# Patient Record
Sex: MALE | URBAN - METROPOLITAN AREA
[De-identification: names, ages, dates, MRNs, and addresses within clinical notes are randomized per-mention and may not be internally consistent; named-entity substitution may affect disease eponyms.]

---

## 2017-01-30 ENCOUNTER — APPOINTMENT (RX ONLY)
Age: 52
Setting detail: DERMATOLOGY
End: 2017-01-30

## 2017-01-30 DIAGNOSIS — Z85.828 PERSONAL HISTORY OF OTHER MALIGNANT NEOPLASM OF SKIN: ICD-10-CM | Status: RESOLVED

## 2017-01-30 DIAGNOSIS — B07.8 OTHER VIRAL WARTS: ICD-10-CM | Status: INADEQUATELY CONTROLLED

## 2017-01-30 DIAGNOSIS — L57.0 ACTINIC KERATOSIS: ICD-10-CM | Status: INADEQUATELY CONTROLLED

## 2017-01-30 PROBLEM — D48.5 NEOPLASM OF UNCERTAIN BEHAVIOR OF SKIN: Status: ACTIVE | Noted: 2017-01-30

## 2017-01-30 PROCEDURE — 17110 DESTRUCTION B9 LES UP TO 14: CPT

## 2017-01-30 PROCEDURE — ? BIOPSY BY SHAVE METHOD

## 2017-01-30 PROCEDURE — ? ORDER FOR PHOTODYNAMIC THERAPY

## 2017-01-30 PROCEDURE — 11100: CPT | Mod: 59

## 2017-01-30 PROCEDURE — 99214 OFFICE O/P EST MOD 30 MIN: CPT | Mod: 25

## 2017-01-30 PROCEDURE — ? COUNSELING

## 2017-01-30 PROCEDURE — ? LIQUID NITROGEN

## 2017-01-30 ASSESSMENT — LOCATION ZONE DERM
LOCATION ZONE: NECK
LOCATION ZONE: ARM
LOCATION ZONE: FINGER

## 2017-01-30 ASSESSMENT — LOCATION DETAILED DESCRIPTION DERM
LOCATION DETAILED: MID POSTERIOR NECK
LOCATION DETAILED: RIGHT MID DORSAL SMALL FINGER
LOCATION DETAILED: LEFT PROXIMAL LATERAL POSTERIOR UPPER ARM

## 2017-01-30 ASSESSMENT — LOCATION SIMPLE DESCRIPTION DERM
LOCATION SIMPLE: POSTERIOR NECK
LOCATION SIMPLE: LEFT UPPER ARM
LOCATION SIMPLE: RIGHT SMALL FINGER

## 2017-01-30 NOTE — PROCEDURE: ORDER FOR PHOTODYNAMIC THERAPY
Face And Neck Incubation Time: 1 Hour
Pdt Type: GAYE-U
Hands Incubation Time: 2 Hours
Location: Face
Location Override: Face and ears
Face Incubation Time: 90 minutes
Frequency Of Pdt: Single Treatment
Detail Level: Zone
Consent: The procedure and risks were reviewed with the patient including but not limited to: burning, pigmentary changes, pain, blistering, scabbing, redness, and the possibility of needing numerous treatments. Strict photoprotection after the procedure.

## 2017-01-30 NOTE — PROCEDURE: BIOPSY BY SHAVE METHOD
X Size Of Lesion In Cm: 0
Wound Care: Aquaphor
Dressing: bandage
Detail Level: Detailed
Electrodesiccation Text: The wound bed was treated with electrodesiccation after the biopsy was performed.
Billing Type: Third-Party Bill
Bill 36215 For Specimen Handling/Conveyance To Laboratory?: no
Biopsy Method: Dermablade
Cryotherapy Text: The wound bed was treated with cryotherapy after the biopsy was performed.
Consent: Verbal consent was obtained and risks were reviewed including but not limited to scarring, infection, bleeding, scabbing, incomplete removal, nerve damage and allergy to anesthesia.
Post-Care Instructions: I reviewed with the patient in detail post-care instructions. Patient is to keep the biopsy site dry overnight, and then apply bacitracin twice daily until healed. Patient may apply hydrogen peroxide soaks to remove any crusting.
Anesthesia Volume In Cc: 0.5
Anesthesia Type: 1% lidocaine without epinephrine
Silver Nitrate Text: The wound bed was treated with silver nitrate after the biopsy was performed.
Biopsy Type: H and E
Notification Instructions: Patient will be notified of biopsy results. However, patient instructed to call the office if not contacted within 2 weeks.
Electrodesiccation And Curettage Text: The wound bed was treated with electrodesiccation and curettage after the biopsy was performed.
Size Of Lesion In Cm: 1.2
Hemostasis: Electrocautery
Type Of Destruction Used: Electrodesiccation and Curettage
Curettage Text: The wound bed was treated with curettage after the biopsy was performed.

## 2017-01-30 NOTE — PROCEDURE: LIQUID NITROGEN
Post-Care Instructions: I reviewed with the patient in detail post-care instructions. Patient is to wear sunprotection, and avoid picking at any of the treated lesions. Pt may apply Vaseline to crusted or scabbing areas.
Detail Level: Detailed
Medical Necessity Information: It is in your best interest to select a reason for this procedure from the list below. All of these items fulfill various CMS LCD requirements except the new and changing color options.
Medical Necessity Clause: This procedure was medically necessary because the lesions that were treated were:
Render Post-Care Instructions In Note?: no
Duration Of Freeze Thaw-Cycle (Seconds): 0
Consent: The patient's consent was obtained including but not limited to risks of crusting, scabbing, blistering, scarring, darker or lighter pigmentary change, recurrence, incomplete removal and infection.

## 2017-02-06 ENCOUNTER — APPOINTMENT (RX ONLY)
Age: 52
Setting detail: DERMATOLOGY
End: 2017-02-06

## 2017-02-06 PROBLEM — C44.619 BASAL CELL CARCINOMA OF SKIN OF LEFT UPPER LIMB, INCLUDING SHOULDER: Status: ACTIVE | Noted: 2017-02-06

## 2017-02-06 PROCEDURE — ? CURETTAGE AND DESTRUCTION

## 2017-02-06 PROCEDURE — 17263 DSTRJ MAL LES T/A/L 2.1-3.0: CPT | Mod: 79

## 2017-02-06 PROCEDURE — ? COUNSELING

## 2017-02-06 NOTE — PROCEDURE: CURETTAGE AND DESTRUCTION
Bill For Surgical Tray: no
Post-Care Instructions: I reviewed with the patient in detail post-care instructions. Patient is to keep the area dry for 48 hours, and not to engage in any swimming until the area is healed. Should the patient develop any fevers, chills, bleeding, severe pain patient will contact the office immediately.
What Was Performed First?: Destruction
Cautery Type: electrodesiccation
Detail Level: Detailed
Number Of Curettages: 3
Anesthesia Type: 1% lidocaine without epinephrine
Size Of Lesion In Cm: 1.2
Additional Information: (Optional): The wound was cleaned, and a pressure dressing was applied.  The patient received detailed post-op instructions.
Anesthesia Volume In Cc: 0.5
Size Of Lesion After Curettage: 2.1
Consent was obtained from the patient. The risks, benefits and alternatives to therapy were discussed in detail. Specifically, the risks of infection, scarring, bleeding, prolonged wound healing, nerve injury, incomplete removal, allergy to anesthesia and recurrence were addressed. Alternatives to ED&C, such as: surgical removal and XRT were also discussed.  Prior to the procedure, the treatment site was clearly identified and confirmed by the patient. All components of Universal Protocol/PAUSE Rule completed.
Bill As A Line Item Or As Units: Line Item

## 2017-03-07 ENCOUNTER — APPOINTMENT (RX ONLY)
Age: 52
Setting detail: DERMATOLOGY
End: 2017-03-07

## 2017-03-07 DIAGNOSIS — L57.0 ACTINIC KERATOSIS: ICD-10-CM

## 2017-03-07 PROCEDURE — ? PDT: BLUE

## 2017-03-07 PROCEDURE — 96567 PDT DSTR PRMLG LES SKN: CPT

## 2017-03-07 ASSESSMENT — LOCATION DETAILED DESCRIPTION DERM
LOCATION DETAILED: SUPERIOR MID FOREHEAD
LOCATION DETAILED: LEFT CYMBA CONCHA
LOCATION DETAILED: RIGHT INFERIOR CRUS OF ANTIHELIX

## 2017-03-07 ASSESSMENT — LOCATION SIMPLE DESCRIPTION DERM
LOCATION SIMPLE: LEFT EAR
LOCATION SIMPLE: RIGHT EAR
LOCATION SIMPLE: SUPERIOR FOREHEAD

## 2017-03-07 ASSESSMENT — LOCATION ZONE DERM
LOCATION ZONE: EAR
LOCATION ZONE: FACE

## 2017-03-07 NOTE — PROCEDURE: PDT: BLUE
Pre-Procedure Text: The treatment areas were cleaned and prepped in the usual fashion.
Illumination Time: 00:16:40
Consent: Verbal consent obtained.  The risks were reviewed with the patient including but not limited to: pigmentary changes, pain, blistering, scabbing, redness, and the remote possibility of scarring.
Occlusion: No
Which Photosensitizer Was Used: Levulan
Incubation Time: 90 minutes
Comments: Patient's wife will be driving patient home with a towel over his face and ears. Samples provided.
Anesthesia Volume In Cc: 0
Detail Level: Zone
Post-Care Instructions: I reviewed with the patient in detail post-care instructions. Patient is to avoid sunlight for the next 2 days, and wear sun protection. Patients may expect sunburn like redness, discomfort and scabbing.
Expiration Date (Optional): 08/19
Light Source: Lucian-U
Treatment Number: 1
Ndc# (Optional): 83672-857-63
Anesthesia Type: 1% lidocaine with epinephrine
Lot # (Optional): [id number]

## 2017-04-03 ENCOUNTER — APPOINTMENT (RX ONLY)
Age: 52
Setting detail: DERMATOLOGY
End: 2017-04-03

## 2017-04-03 DIAGNOSIS — Z87.2 PERSONAL HISTORY OF DISEASES OF THE SKIN AND SUBCUTANEOUS TISSUE: ICD-10-CM | Status: WELL CONTROLLED

## 2017-04-03 DIAGNOSIS — Z85.828 PERSONAL HISTORY OF OTHER MALIGNANT NEOPLASM OF SKIN: ICD-10-CM | Status: RESOLVED

## 2017-04-03 DIAGNOSIS — L82.1 OTHER SEBORRHEIC KERATOSIS: ICD-10-CM | Status: STABLE

## 2017-04-03 DIAGNOSIS — D22 MELANOCYTIC NEVI: ICD-10-CM | Status: STABLE

## 2017-04-03 PROBLEM — D22.61 MELANOCYTIC NEVI OF RIGHT UPPER LIMB, INCLUDING SHOULDER: Status: ACTIVE | Noted: 2017-04-03

## 2017-04-03 PROBLEM — D22.62 MELANOCYTIC NEVI OF LEFT UPPER LIMB, INCLUDING SHOULDER: Status: ACTIVE | Noted: 2017-04-03

## 2017-04-03 PROBLEM — D22.39 MELANOCYTIC NEVI OF OTHER PARTS OF FACE: Status: ACTIVE | Noted: 2017-04-03

## 2017-04-03 PROCEDURE — ? COUNSELING

## 2017-04-03 PROCEDURE — 99213 OFFICE O/P EST LOW 20 MIN: CPT

## 2017-04-03 ASSESSMENT — LOCATION DETAILED DESCRIPTION DERM
LOCATION DETAILED: LEFT PROXIMAL LATERAL POSTERIOR UPPER ARM
LOCATION DETAILED: RIGHT PROXIMAL DORSAL FOREARM
LOCATION DETAILED: LEFT PROXIMAL LATERAL POSTERIOR UPPER ARM
LOCATION DETAILED: RIGHT INFERIOR CENTRAL MALAR CHEEK
LOCATION DETAILED: LEFT CYMBA CONCHA
LOCATION DETAILED: LEFT INFERIOR CENTRAL MALAR CHEEK
LOCATION DETAILED: RIGHT DISTAL DORSAL FOREARM
LOCATION DETAILED: LEFT PROXIMAL DORSAL FOREARM
LOCATION DETAILED: RIGHT ANTIHELIX
LOCATION DETAILED: LEFT DISTAL DORSAL FOREARM

## 2017-04-03 ASSESSMENT — LOCATION SIMPLE DESCRIPTION DERM
LOCATION SIMPLE: LEFT FOREARM
LOCATION SIMPLE: RIGHT CHEEK
LOCATION SIMPLE: RIGHT EAR
LOCATION SIMPLE: LEFT UPPER ARM
LOCATION SIMPLE: LEFT CHEEK
LOCATION SIMPLE: LEFT UPPER ARM
LOCATION SIMPLE: LEFT EAR
LOCATION SIMPLE: RIGHT FOREARM

## 2017-04-03 ASSESSMENT — LOCATION ZONE DERM
LOCATION ZONE: EAR
LOCATION ZONE: FACE
LOCATION ZONE: ARM
LOCATION ZONE: ARM

## 2017-10-09 ENCOUNTER — APPOINTMENT (RX ONLY)
Age: 52
Setting detail: DERMATOLOGY
End: 2017-10-09

## 2017-10-09 DIAGNOSIS — L82.1 OTHER SEBORRHEIC KERATOSIS: ICD-10-CM | Status: STABLE

## 2017-10-09 DIAGNOSIS — Z85.828 PERSONAL HISTORY OF OTHER MALIGNANT NEOPLASM OF SKIN: ICD-10-CM | Status: RESOLVED

## 2017-10-09 DIAGNOSIS — D22 MELANOCYTIC NEVI: ICD-10-CM | Status: STABLE

## 2017-10-09 DIAGNOSIS — Z87.2 PERSONAL HISTORY OF DISEASES OF THE SKIN AND SUBCUTANEOUS TISSUE: ICD-10-CM | Status: STABLE

## 2017-10-09 PROBLEM — D22.5 MELANOCYTIC NEVI OF TRUNK: Status: ACTIVE | Noted: 2017-10-09

## 2017-10-09 PROCEDURE — 99214 OFFICE O/P EST MOD 30 MIN: CPT

## 2017-10-09 PROCEDURE — ? COUNSELING

## 2017-10-09 ASSESSMENT — LOCATION SIMPLE DESCRIPTION DERM
LOCATION SIMPLE: RIGHT UPPER BACK
LOCATION SIMPLE: LEFT FOREHEAD
LOCATION SIMPLE: LEFT ANTERIOR NECK
LOCATION SIMPLE: LEFT UPPER ARM

## 2017-10-09 ASSESSMENT — LOCATION ZONE DERM
LOCATION ZONE: TRUNK
LOCATION ZONE: NECK
LOCATION ZONE: ARM
LOCATION ZONE: FACE

## 2017-10-09 ASSESSMENT — LOCATION DETAILED DESCRIPTION DERM
LOCATION DETAILED: RIGHT MEDIAL UPPER BACK
LOCATION DETAILED: LEFT SUPERIOR ANTERIOR NECK
LOCATION DETAILED: RIGHT SUPERIOR UPPER BACK
LOCATION DETAILED: LEFT INFERIOR MEDIAL FOREHEAD
LOCATION DETAILED: LEFT ANTERIOR PROXIMAL UPPER ARM

## 2018-04-09 ENCOUNTER — APPOINTMENT (RX ONLY)
Age: 53
Setting detail: DERMATOLOGY
End: 2018-04-09

## 2018-04-09 DIAGNOSIS — B07.8 OTHER VIRAL WARTS: ICD-10-CM | Status: INADEQUATELY CONTROLLED

## 2018-04-09 DIAGNOSIS — B36.0 PITYRIASIS VERSICOLOR: ICD-10-CM | Status: INADEQUATELY CONTROLLED

## 2018-04-09 DIAGNOSIS — Z87.2 PERSONAL HISTORY OF DISEASES OF THE SKIN AND SUBCUTANEOUS TISSUE: ICD-10-CM

## 2018-04-09 DIAGNOSIS — Z85.828 PERSONAL HISTORY OF OTHER MALIGNANT NEOPLASM OF SKIN: ICD-10-CM

## 2018-04-09 PROCEDURE — ? LIQUID NITROGEN

## 2018-04-09 PROCEDURE — ? COUNSELING

## 2018-04-09 PROCEDURE — ? EDUCATIONAL RESOURCES PROVIDED

## 2018-04-09 PROCEDURE — ? PRESCRIPTION

## 2018-04-09 PROCEDURE — 17110 DESTRUCTION B9 LES UP TO 14: CPT

## 2018-04-09 PROCEDURE — 99214 OFFICE O/P EST MOD 30 MIN: CPT | Mod: 25

## 2018-04-09 RX ORDER — FLUCONAZOLE 150 MG/1
TABLET ORAL
Qty: 4 | Refills: 0 | Status: ERX

## 2018-04-09 ASSESSMENT — LOCATION ZONE DERM
LOCATION ZONE: NECK
LOCATION ZONE: FACE
LOCATION ZONE: ARM
LOCATION ZONE: TRUNK
LOCATION ZONE: FINGER

## 2018-04-09 ASSESSMENT — LOCATION SIMPLE DESCRIPTION DERM
LOCATION SIMPLE: LEFT UPPER ARM
LOCATION SIMPLE: LEFT ANTERIOR NECK
LOCATION SIMPLE: LEFT FOREHEAD
LOCATION SIMPLE: RIGHT THUMB
LOCATION SIMPLE: UPPER BACK

## 2018-04-09 ASSESSMENT — LOCATION DETAILED DESCRIPTION DERM
LOCATION DETAILED: LEFT INFERIOR MEDIAL FOREHEAD
LOCATION DETAILED: LEFT SUPERIOR ANTERIOR NECK
LOCATION DETAILED: RIGHT DISTAL RADIAL THUMB
LOCATION DETAILED: SUPERIOR THORACIC SPINE
LOCATION DETAILED: LEFT ANTERIOR PROXIMAL UPPER ARM

## 2018-04-09 ASSESSMENT — SEVERITY ASSESSMENT: SEVERITY: MILD

## 2018-04-09 NOTE — HPI: SKIN LESION
How Severe Is Your Skin Lesion?: mild
Has Your Skin Lesion Been Treated?: not been treated
Is This A New Presentation, Or A Follow-Up?: Skin Lesions
Additional History: Patient declines full skin exam and waist up exam.

## 2018-05-21 ENCOUNTER — APPOINTMENT (RX ONLY)
Age: 53
Setting detail: DERMATOLOGY
End: 2018-05-21

## 2018-05-21 DIAGNOSIS — B07.8 OTHER VIRAL WARTS: ICD-10-CM | Status: IMPROVED

## 2018-05-21 DIAGNOSIS — B07.0 PLANTAR WART: ICD-10-CM | Status: INADEQUATELY CONTROLLED

## 2018-05-21 PROCEDURE — ? COUNSELING

## 2018-05-21 PROCEDURE — ? LIQUID NITROGEN

## 2018-05-21 PROCEDURE — 17110 DESTRUCTION B9 LES UP TO 14: CPT

## 2018-05-21 ASSESSMENT — LOCATION DETAILED DESCRIPTION DERM
LOCATION DETAILED: RIGHT LATERAL PLANTAR MIDFOOT
LOCATION DETAILED: RIGHT DISTAL RADIAL THUMB

## 2018-05-21 ASSESSMENT — LOCATION SIMPLE DESCRIPTION DERM
LOCATION SIMPLE: RIGHT PLANTAR SURFACE
LOCATION SIMPLE: RIGHT THUMB

## 2018-05-21 ASSESSMENT — LOCATION ZONE DERM
LOCATION ZONE: FEET
LOCATION ZONE: FINGER

## 2018-06-25 ENCOUNTER — APPOINTMENT (RX ONLY)
Age: 53
Setting detail: DERMATOLOGY
End: 2018-06-25

## 2018-06-25 DIAGNOSIS — B07.0 PLANTAR WART: ICD-10-CM | Status: IMPROVED

## 2018-06-25 DIAGNOSIS — B07.8 OTHER VIRAL WARTS: ICD-10-CM | Status: IMPROVED

## 2018-06-25 PROBLEM — D48.5 NEOPLASM OF UNCERTAIN BEHAVIOR OF SKIN: Status: ACTIVE | Noted: 2018-06-25

## 2018-06-25 PROCEDURE — ? LIQUID NITROGEN

## 2018-06-25 PROCEDURE — 11100: CPT | Mod: 59

## 2018-06-25 PROCEDURE — 17110 DESTRUCTION B9 LES UP TO 14: CPT

## 2018-06-25 PROCEDURE — ? BIOPSY BY SHAVE METHOD

## 2018-06-25 ASSESSMENT — LOCATION SIMPLE DESCRIPTION DERM
LOCATION SIMPLE: RIGHT THUMB
LOCATION SIMPLE: RIGHT PLANTAR SURFACE

## 2018-06-25 ASSESSMENT — LOCATION DETAILED DESCRIPTION DERM
LOCATION DETAILED: RIGHT LATERAL PLANTAR MIDFOOT
LOCATION DETAILED: RIGHT DISTAL VENTRAL THUMB

## 2018-06-25 ASSESSMENT — LOCATION ZONE DERM
LOCATION ZONE: FEET
LOCATION ZONE: FINGER

## 2018-06-25 NOTE — HPI: SKIN LESION
How Severe Is Your Skin Lesion?: mild
Has Your Skin Lesion Been Treated?: not been treated
Is This A New Presentation, Or A Follow-Up?: Skin Lesion
Additional History: Patient states it usually bleeds if rubbed.

## 2018-06-25 NOTE — PROCEDURE: BIOPSY BY SHAVE METHOD
Destruction After The Procedure: No
Depth Of Biopsy: dermis
X Size Of Lesion In Cm: 0
Billing Type: Third-Party Bill
Additional Anticipated Plans: If malignant consider Mohs surgery
Type Of Destruction Used: Electrodesiccation and Curettage
Electrodesiccation And Curettage Text: The wound bed was treated with electrodesiccation and curettage after the biopsy was performed.
Biopsy Method: Dermablade
Notification Instructions: Patient will be notified of biopsy results. However, patient instructed to call the office if not contacted within 2 weeks.
Was A Bandage Applied: Yes
Post-Care Instructions: I reviewed with the patient in detail post-care instructions. Patient is to keep the biopsy site dry overnight, and then apply bacitracin twice daily until healed. Patient may apply hydrogen peroxide soaks to remove any crusting.
Wound Care: Aquaphor
Electrodesiccation Text: The wound bed was treated with electrodesiccation after the biopsy was performed.
Cryotherapy Text: The wound bed was treated with cryotherapy after the biopsy was performed.
Silver Nitrate Text: The wound bed was treated with silver nitrate after the biopsy was performed.
Hemostasis: Electrocautery
Curettage Text: The wound bed was treated with curettage after the biopsy was performed.
Consent: Verbal consent was obtained and risks were reviewed including but not limited to scarring, infection, bleeding, scabbing, incomplete removal, nerve damage and allergy to anesthesia.
Dressing: bandage
Biopsy Type: H and E
Anesthesia Volume In Cc: 0.5
Detail Level: Detailed
Anesthesia Type: 1% lidocaine without epinephrine

## 2018-06-29 ENCOUNTER — APPOINTMENT (RX ONLY)
Age: 53
Setting detail: DERMATOLOGY
End: 2018-06-29

## 2018-06-29 PROBLEM — C44.319 BASAL CELL CARCINOMA OF SKIN OF OTHER PARTS OF FACE: Status: ACTIVE | Noted: 2018-06-29

## 2018-06-29 PROCEDURE — ? REFERRAL

## 2020-11-12 ENCOUNTER — APPOINTMENT (RX ONLY)
Age: 55
Setting detail: DERMATOLOGY
End: 2020-11-12

## 2020-11-12 VITALS — TEMPERATURE: 97.6 F

## 2020-11-12 DIAGNOSIS — D22 MELANOCYTIC NEVI: ICD-10-CM | Status: STABLE

## 2020-11-12 DIAGNOSIS — L82.1 OTHER SEBORRHEIC KERATOSIS: ICD-10-CM | Status: STABLE

## 2020-11-12 DIAGNOSIS — Z85.828 PERSONAL HISTORY OF OTHER MALIGNANT NEOPLASM OF SKIN: ICD-10-CM | Status: RESOLVED

## 2020-11-12 DIAGNOSIS — L57.0 ACTINIC KERATOSIS: ICD-10-CM | Status: INADEQUATELY CONTROLLED

## 2020-11-12 DIAGNOSIS — B07.8 OTHER VIRAL WARTS: ICD-10-CM | Status: INADEQUATELY CONTROLLED

## 2020-11-12 PROBLEM — D22.5 MELANOCYTIC NEVI OF TRUNK: Status: ACTIVE | Noted: 2020-11-12

## 2020-11-12 PROCEDURE — ? PRESCRIPTION

## 2020-11-12 PROCEDURE — ? LIQUID NITROGEN

## 2020-11-12 PROCEDURE — ? COUNSELING

## 2020-11-12 PROCEDURE — ? TREATMENT REGIMEN

## 2020-11-12 PROCEDURE — 99214 OFFICE O/P EST MOD 30 MIN: CPT | Mod: 25

## 2020-11-12 PROCEDURE — 17110 DESTRUCTION B9 LES UP TO 14: CPT

## 2020-11-12 RX ORDER — FLUOROURACIL 5 MG/G
CREAM TOPICAL
Qty: 1 | Refills: 0 | Status: ERX

## 2020-11-12 ASSESSMENT — LOCATION SIMPLE DESCRIPTION DERM
LOCATION SIMPLE: UPPER BACK
LOCATION SIMPLE: RIGHT UPPER BACK
LOCATION SIMPLE: LEFT CHEEK
LOCATION SIMPLE: LEFT FOREHEAD
LOCATION SIMPLE: LEFT EAR
LOCATION SIMPLE: RIGHT THUMB
LOCATION SIMPLE: RIGHT EAR

## 2020-11-12 ASSESSMENT — LOCATION ZONE DERM
LOCATION ZONE: TRUNK
LOCATION ZONE: FINGER
LOCATION ZONE: EAR
LOCATION ZONE: FACE

## 2020-11-12 ASSESSMENT — LOCATION DETAILED DESCRIPTION DERM
LOCATION DETAILED: LEFT MEDIAL FOREHEAD
LOCATION DETAILED: INFERIOR THORACIC SPINE
LOCATION DETAILED: LEFT INFERIOR CENTRAL MALAR CHEEK
LOCATION DETAILED: RIGHT INFERIOR MEDIAL UPPER BACK
LOCATION DETAILED: RIGHT CYMBA CONCHA
LOCATION DETAILED: RIGHT DISTAL VENTRAL THUMB
LOCATION DETAILED: LEFT CYMBA CONCHA

## 2020-11-12 NOTE — PROCEDURE: LIQUID NITROGEN
Post-Care Instructions: I reviewed with the patient in detail post-care instructions. Patient is to wear sunprotection, and avoid picking at any of the treated lesions. Pt may apply Vaseline to crusted or scabbing areas.
Medical Necessity Clause: This procedure was medically necessary because the lesions that were treated were:
Consent: The patient's consent was obtained including but not limited to risks of crusting, scabbing, blistering, scarring, darker or lighter pigmentary change, recurrence, incomplete removal and infection.
Render Note In Bullet Format When Appropriate: No
Detail Level: Detailed
Medical Necessity Information: It is in your best interest to select a reason for this procedure from the list below. All of these items fulfill various CMS LCD requirements except the new and changing color options.

## 2020-12-15 ENCOUNTER — APPOINTMENT (RX ONLY)
Age: 55
Setting detail: DERMATOLOGY
End: 2020-12-15

## 2020-12-15 VITALS — TEMPERATURE: 97.3 F

## 2020-12-15 DIAGNOSIS — B07.8 OTHER VIRAL WARTS: ICD-10-CM | Status: IMPROVED

## 2020-12-15 PROCEDURE — ? TREATMENT REGIMEN

## 2020-12-15 PROCEDURE — 17110 DESTRUCTION B9 LES UP TO 14: CPT

## 2020-12-15 PROCEDURE — ? LIQUID NITROGEN

## 2020-12-15 ASSESSMENT — LOCATION DETAILED DESCRIPTION DERM: LOCATION DETAILED: RIGHT DISTAL VENTRAL THUMB

## 2020-12-15 ASSESSMENT — LOCATION SIMPLE DESCRIPTION DERM: LOCATION SIMPLE: RIGHT THUMB

## 2020-12-15 ASSESSMENT — LOCATION ZONE DERM: LOCATION ZONE: FINGER

## 2020-12-15 NOTE — PROCEDURE: TREATMENT REGIMEN
Plan: Suggested OTC salicylic acid gel or solution if wart reoccurs prior to follow up
Detail Level: Zone

## 2021-03-22 ENCOUNTER — APPOINTMENT (RX ONLY)
Age: 56
Setting detail: DERMATOLOGY
End: 2021-03-22

## 2021-03-22 DIAGNOSIS — Z87.2 PERSONAL HISTORY OF DISEASES OF THE SKIN AND SUBCUTANEOUS TISSUE: ICD-10-CM | Status: WELL CONTROLLED

## 2021-03-22 DIAGNOSIS — D22 MELANOCYTIC NEVI: ICD-10-CM | Status: STABLE

## 2021-03-22 DIAGNOSIS — Z85.828 PERSONAL HISTORY OF OTHER MALIGNANT NEOPLASM OF SKIN: ICD-10-CM | Status: RESOLVED

## 2021-03-22 DIAGNOSIS — B07.8 OTHER VIRAL WARTS: ICD-10-CM | Status: INADEQUATELY CONTROLLED

## 2021-03-22 DIAGNOSIS — L82.1 OTHER SEBORRHEIC KERATOSIS: ICD-10-CM | Status: STABLE

## 2021-03-22 PROBLEM — D22.39 MELANOCYTIC NEVI OF OTHER PARTS OF FACE: Status: ACTIVE | Noted: 2021-03-22

## 2021-03-22 PROCEDURE — 99213 OFFICE O/P EST LOW 20 MIN: CPT

## 2021-03-22 PROCEDURE — ? TREATMENT REGIMEN

## 2021-03-22 PROCEDURE — ? COUNSELING

## 2021-03-22 ASSESSMENT — LOCATION SIMPLE DESCRIPTION DERM
LOCATION SIMPLE: RIGHT THUMB
LOCATION SIMPLE: LEFT CHEEK
LOCATION SIMPLE: RIGHT EAR
LOCATION SIMPLE: RIGHT CHEEK
LOCATION SIMPLE: LEFT EAR

## 2021-03-22 ASSESSMENT — LOCATION ZONE DERM
LOCATION ZONE: FACE
LOCATION ZONE: EAR
LOCATION ZONE: FINGER

## 2021-03-22 ASSESSMENT — LOCATION DETAILED DESCRIPTION DERM
LOCATION DETAILED: LEFT SUPERIOR LATERAL BUCCAL CHEEK
LOCATION DETAILED: LEFT SUPERIOR CRUS OF ANTIHELIX
LOCATION DETAILED: RIGHT DISTAL RADIAL THUMB
LOCATION DETAILED: RIGHT INFERIOR CENTRAL MALAR CHEEK
LOCATION DETAILED: RIGHT TRIANGULAR FOSSA
LOCATION DETAILED: LEFT INFERIOR CENTRAL MALAR CHEEK

## 2021-03-22 NOTE — PROCEDURE: TREATMENT REGIMEN
Plan: Ordered salicylic acid gel/ fluorourcil cream compounded, apply at night with occlusion. Called RX to [facility]
Detail Level: Detailed

## 2021-07-16 ENCOUNTER — APPOINTMENT (RX ONLY)
Age: 56
Setting detail: DERMATOLOGY
End: 2021-07-16

## 2021-07-16 DIAGNOSIS — L72.8 OTHER FOLLICULAR CYSTS OF THE SKIN AND SUBCUTANEOUS TISSUE: ICD-10-CM | Status: INADEQUATELY CONTROLLED

## 2021-07-16 DIAGNOSIS — B07.8 OTHER VIRAL WARTS: ICD-10-CM | Status: RESOLVED

## 2021-07-16 DIAGNOSIS — D22 MELANOCYTIC NEVI: ICD-10-CM | Status: STABLE

## 2021-07-16 DIAGNOSIS — L82.1 OTHER SEBORRHEIC KERATOSIS: ICD-10-CM | Status: STABLE

## 2021-07-16 PROBLEM — D22.39 MELANOCYTIC NEVI OF OTHER PARTS OF FACE: Status: ACTIVE | Noted: 2021-07-16

## 2021-07-16 PROCEDURE — ? DIAGNOSIS COMMENT

## 2021-07-16 PROCEDURE — 10040 EXTRACTION: CPT

## 2021-07-16 PROCEDURE — ? INTRALESIONAL KENALOG

## 2021-07-16 PROCEDURE — 11900 INJECT SKIN LESIONS </W 7: CPT

## 2021-07-16 PROCEDURE — 99213 OFFICE O/P EST LOW 20 MIN: CPT | Mod: 25

## 2021-07-16 PROCEDURE — ? ACNE SURGERY

## 2021-07-16 PROCEDURE — ? COUNSELING

## 2021-07-16 ASSESSMENT — LOCATION DETAILED DESCRIPTION DERM
LOCATION DETAILED: LEFT INFERIOR LATERAL NECK
LOCATION DETAILED: RIGHT DISTAL RADIAL THUMB
LOCATION DETAILED: INFERIOR MID FOREHEAD
LOCATION DETAILED: SUPERIOR MID FOREHEAD
LOCATION DETAILED: RIGHT INFERIOR CENTRAL MALAR CHEEK

## 2021-07-16 ASSESSMENT — LOCATION SIMPLE DESCRIPTION DERM
LOCATION SIMPLE: RIGHT THUMB
LOCATION SIMPLE: RIGHT CHEEK
LOCATION SIMPLE: LEFT ANTERIOR NECK
LOCATION SIMPLE: SUPERIOR FOREHEAD
LOCATION SIMPLE: INFERIOR FOREHEAD

## 2021-07-16 ASSESSMENT — LOCATION ZONE DERM
LOCATION ZONE: FINGER
LOCATION ZONE: FACE
LOCATION ZONE: NECK

## 2021-07-16 NOTE — PROCEDURE: ACNE SURGERY
Prep Text (Optional): Prior to removal the treatment areas were prepped in the usual fashion.
Extraction Method: lancet and extractor
Render Post-Care Instructions In Note?: no
Hemostasis: Drysol
Render Number Of Lesions Treated: yes
Post-Care Instructions: I reviewed with the patient in detail post-care instructions. Patient is to keep the treatment areaas dry overnight, and then apply bacitracin twice daily until healed. Patient may apply hydrogen peroxide soaks to remove any crusting.
Consent was obtained and risks were reviewed including but not limited to scarring, infection, bleeding, scabbing, incomplete removal, and allergy to anesthesia.
Detail Level: Detailed
Acne Type: Milial cysts

## 2021-07-16 NOTE — PROCEDURE: INTRALESIONAL KENALOG
Detail Level: Detailed
Total Volume (Ccs): .1
Treatment Number (Optional): 1
Include Z78.9 (Other Specified Conditions Influencing Health Status) As An Associated Diagnosis?: No
Medical Necessity Clause: This procedure was medically necessary because the lesions that were treated were:
Concentration Of Kenalog Solution Injected (Mg/Ml): 5.0
Kenalog Preparation: Kenalog
Validate Note Data When Using Inventory: Yes
X Size Of Lesion In Cm (Optional): 0
Consent: The risks of atrophy were reviewed with the patient.
Administered By (Optional): Dr. Ashish Acosta

## 2021-09-20 ENCOUNTER — APPOINTMENT (RX ONLY)
Age: 56
Setting detail: DERMATOLOGY
End: 2021-09-20

## 2021-09-20 DIAGNOSIS — L57.0 ACTINIC KERATOSIS: ICD-10-CM | Status: INADEQUATELY CONTROLLED

## 2021-09-20 DIAGNOSIS — Z85.828 PERSONAL HISTORY OF OTHER MALIGNANT NEOPLASM OF SKIN: ICD-10-CM | Status: RESOLVED

## 2021-09-20 DIAGNOSIS — L72.0 EPIDERMAL CYST: ICD-10-CM | Status: STABLE

## 2021-09-20 DIAGNOSIS — D22 MELANOCYTIC NEVI: ICD-10-CM | Status: STABLE

## 2021-09-20 DIAGNOSIS — L82.1 OTHER SEBORRHEIC KERATOSIS: ICD-10-CM | Status: STABLE

## 2021-09-20 PROBLEM — D22.39 MELANOCYTIC NEVI OF OTHER PARTS OF FACE: Status: ACTIVE | Noted: 2021-09-20

## 2021-09-20 PROBLEM — D48.5 NEOPLASM OF UNCERTAIN BEHAVIOR OF SKIN: Status: ACTIVE | Noted: 2021-09-20

## 2021-09-20 PROCEDURE — ? REFUSAL OF TREATMENT

## 2021-09-20 PROCEDURE — ? BIOPSY BY SHAVE METHOD

## 2021-09-20 PROCEDURE — 11102 TANGNTL BX SKIN SINGLE LES: CPT

## 2021-09-20 PROCEDURE — 99213 OFFICE O/P EST LOW 20 MIN: CPT | Mod: 25

## 2021-09-20 PROCEDURE — ? COUNSELING

## 2021-09-20 ASSESSMENT — LOCATION ZONE DERM
LOCATION ZONE: FACE
LOCATION ZONE: ARM
LOCATION ZONE: NECK

## 2021-09-20 ASSESSMENT — LOCATION DETAILED DESCRIPTION DERM
LOCATION DETAILED: LEFT DISTAL POSTERIOR UPPER ARM
LOCATION DETAILED: RIGHT DISTAL POSTERIOR UPPER ARM
LOCATION DETAILED: LEFT SUPERIOR LATERAL BUCCAL CHEEK
LOCATION DETAILED: LEFT SUPERIOR CENTRAL MALAR CHEEK
LOCATION DETAILED: LEFT ANTERIOR PROXIMAL UPPER ARM
LOCATION DETAILED: RIGHT CENTRAL MALAR CHEEK
LOCATION DETAILED: LEFT INFERIOR CENTRAL MALAR CHEEK
LOCATION DETAILED: RIGHT INFERIOR CENTRAL MALAR CHEEK
LOCATION DETAILED: RIGHT ANTERIOR SHOULDER
LOCATION DETAILED: RIGHT SUPERIOR ANTERIOR NECK

## 2021-09-20 ASSESSMENT — LOCATION SIMPLE DESCRIPTION DERM
LOCATION SIMPLE: RIGHT CHEEK
LOCATION SIMPLE: LEFT CHEEK
LOCATION SIMPLE: RIGHT UPPER ARM
LOCATION SIMPLE: RIGHT SHOULDER
LOCATION SIMPLE: RIGHT ANTERIOR NECK
LOCATION SIMPLE: LEFT UPPER ARM

## 2021-09-20 NOTE — PROCEDURE: BIOPSY BY SHAVE METHOD
Type Of Destruction Used: Electrodesiccation and Curettage
Hide Biopsy Depth?: No
X Size Of Lesion In Cm: 0
Dressing: bandage
Was A Bandage Applied: Yes
Detail Level: Detailed
Curettage Text: The wound bed was treated with curettage after the biopsy was performed.
Billing Type: Third-Party Bill
Biopsy Method: Dermablade
Anesthesia Type: 2% lidocaine without epinephrine
Information: Selecting Yes will display possible errors in your note based on the variables you have selected. This validation is only offered as a suggestion for you. PLEASE NOTE THAT THE VALIDATION TEXT WILL BE REMOVED WHEN YOU FINALIZE YOUR NOTE. IF YOU WANT TO FAX A PRELIMINARY NOTE YOU WILL NEED TO TOGGLE THIS TO 'NO' IF YOU DO NOT WANT IT IN YOUR FAXED NOTE.
Silver Nitrate Text: The wound bed was treated with silver nitrate after the biopsy was performed.
Post-Care Instructions: I reviewed with the patient in detail post-care instructions. Patient is to keep the biopsy site dry overnight, and then apply bacitracin twice daily until healed. Patient may apply hydrogen peroxide soaks to remove any crusting.
Cryotherapy Text: The wound bed was treated with cryotherapy after the biopsy was performed.
Consent: Verbal consent was obtained and risks were reviewed including but not limited to scarring, infection, bleeding, scabbing, incomplete removal, nerve damage and allergy to anesthesia.
Notification Instructions: Patient will be notified of biopsy results. However, patient instructed to call the office if not contacted within 2 weeks.
Depth Of Biopsy: dermis
Hemostasis: Electrocautery
Anesthesia Volume In Cc: 0.5
Electrodesiccation Text: The wound bed was treated with electrodesiccation after the biopsy was performed.
Biopsy Type: H and E
Size Of Lesion In Cm: 0.6
Electrodesiccation And Curettage Text: The wound bed was treated with electrodesiccation and curettage after the biopsy was performed.
Wound Care: Aquaphor

## 2021-09-20 NOTE — PROCEDURE: REASSURANCE
Hide Include Location In Plan Question?: No
Detail Level: Zone
Detail Level: Simple
Nicotinamide Supplementation Recommendations: Contact physician writing statin drug for their OK since there is a potential for a drug interaction between niacinamide and a statin drug.

## 2021-10-04 ENCOUNTER — APPOINTMENT (RX ONLY)
Age: 56
Setting detail: DERMATOLOGY
End: 2021-10-04

## 2021-10-04 PROBLEM — C44.612 BASAL CELL CARCINOMA OF SKIN OF RIGHT UPPER LIMB, INCLUDING SHOULDER: Status: ACTIVE | Noted: 2021-10-04

## 2021-10-04 PROCEDURE — ? CURETTAGE AND DESTRUCTION

## 2021-10-04 PROCEDURE — ? COUNSELING

## 2021-10-04 PROCEDURE — 17262 DSTRJ MAL LES T/A/L 1.1-2.0: CPT

## 2021-10-04 NOTE — PROCEDURE: CURETTAGE AND DESTRUCTION
Final Size Statement: The size of the lesion after curettage was
Consent was obtained from the patient. The risks, benefits and alternatives to therapy were discussed in detail. Specifically, the risks of infection, scarring, bleeding, prolonged wound healing, nerve injury, incomplete removal, allergy to anesthesia and recurrence were addressed. Alternatives to ED&C, such as: surgical removal and XRT were also discussed.  Prior to the procedure, the treatment site was clearly identified and confirmed by the patient. All components of Universal Protocol/PAUSE Rule completed.
Hide Accession Number?: No
Size Of Lesion In Cm: 0.6
Bill As A Line Item Or As Units: Line Item
Post-Care Instructions: I reviewed with the patient in detail post-care instructions. Patient is to keep the area dry for 48 hours, and not to engage in any swimming until the area is healed. Should the patient develop any fevers, chills, bleeding, severe pain patient will contact the office immediately.
Size Of Lesion After Curettage: 1.4
Number Of Curettages: 3
Detail Level: Detailed
Anesthesia Volume In Cc: 1.5
What Was Performed First?: Destruction
Total Volume (Ccs): 1
Additional Information: (Optional): The wound was cleaned, and a pressure dressing was applied.  The patient received detailed post-op instructions.
Anesthesia Type: 1% lidocaine without epinephrine
Cautery Type: electrodesiccation

## 2021-10-06 ENCOUNTER — APPOINTMENT (RX ONLY)
Age: 56
Setting detail: DERMATOLOGY
End: 2021-10-06

## 2021-10-06 DIAGNOSIS — L72.0 EPIDERMAL CYST: ICD-10-CM | Status: STABLE

## 2021-10-06 PROCEDURE — ? PATIENT SPECIFIC COUNSELING

## 2021-10-06 PROCEDURE — ? ACNE SURGERY COSMETIC

## 2021-10-06 ASSESSMENT — LOCATION ZONE DERM: LOCATION ZONE: EYELID

## 2021-10-06 ASSESSMENT — LOCATION SIMPLE DESCRIPTION DERM: LOCATION SIMPLE: LEFT INFERIOR EYELID

## 2021-10-06 ASSESSMENT — LOCATION DETAILED DESCRIPTION DERM: LOCATION DETAILED: LEFT LATERAL INFERIOR EYELID

## 2021-10-06 NOTE — PROCEDURE: ACNE SURGERY COSMETIC
Price (Use Numbers Only, No Special Characters Or $): 150
Post-Care Instructions: I reviewed with the patient in detail post-care instructions. Patient is to keep the treatment areaas dry overnight, and then apply bacitracin twice daily until healed. Patient may apply hydrogen peroxide soaks to remove any crusting.
Detail Level: Detailed
Acne Type: Comedonal Lesions
Prep Text (Optional): Prior to removal the treatment areas were prepped in the usual fashion.
Consent was obtained and risks were reviewed including but not limited to scarring, infection, bleeding, scabbing, incomplete removal, and allergy to anesthesia.
Render Post-Care Instructions In Note?: no
Render Number Of Lesions Treated: yes
Extraction Method: electrocautery

## 2022-03-07 ENCOUNTER — APPOINTMENT (RX ONLY)
Age: 57
Setting detail: DERMATOLOGY
End: 2022-03-07

## 2022-03-07 DIAGNOSIS — L82.0 INFLAMED SEBORRHEIC KERATOSIS: ICD-10-CM | Status: INADEQUATELY CONTROLLED

## 2022-03-07 DIAGNOSIS — L82.1 OTHER SEBORRHEIC KERATOSIS: ICD-10-CM | Status: STABLE

## 2022-03-07 DIAGNOSIS — D22 MELANOCYTIC NEVI: ICD-10-CM | Status: STABLE

## 2022-03-07 DIAGNOSIS — L57.0 ACTINIC KERATOSIS: ICD-10-CM | Status: INADEQUATELY CONTROLLED

## 2022-03-07 DIAGNOSIS — L72.8 OTHER FOLLICULAR CYSTS OF THE SKIN AND SUBCUTANEOUS TISSUE: ICD-10-CM | Status: INADEQUATELY CONTROLLED

## 2022-03-07 DIAGNOSIS — B36.0 PITYRIASIS VERSICOLOR: ICD-10-CM | Status: INADEQUATELY CONTROLLED

## 2022-03-07 DIAGNOSIS — Z85.828 PERSONAL HISTORY OF OTHER MALIGNANT NEOPLASM OF SKIN: ICD-10-CM | Status: RESOLVED

## 2022-03-07 PROBLEM — D22.5 MELANOCYTIC NEVI OF TRUNK: Status: ACTIVE | Noted: 2022-03-07

## 2022-03-07 PROCEDURE — ? TREATMENT REGIMEN

## 2022-03-07 PROCEDURE — 11900 INJECT SKIN LESIONS </W 7: CPT | Mod: 59

## 2022-03-07 PROCEDURE — 99214 OFFICE O/P EST MOD 30 MIN: CPT | Mod: 25

## 2022-03-07 PROCEDURE — ? COUNSELING

## 2022-03-07 PROCEDURE — ? PRESCRIPTION

## 2022-03-07 PROCEDURE — ? LIQUID NITROGEN

## 2022-03-07 PROCEDURE — 17110 DESTRUCTION B9 LES UP TO 14: CPT

## 2022-03-07 PROCEDURE — ? INTRALESIONAL KENALOG

## 2022-03-07 RX ORDER — FLUCONAZOLE 150 MG/1
TABLET ORAL
Qty: 4 | Refills: 0 | Status: ERX

## 2022-03-07 ASSESSMENT — LOCATION SIMPLE DESCRIPTION DERM
LOCATION SIMPLE: LEFT FOREHEAD
LOCATION SIMPLE: LEFT UPPER ARM
LOCATION SIMPLE: ABDOMEN
LOCATION SIMPLE: RIGHT SHOULDER
LOCATION SIMPLE: RIGHT EAR
LOCATION SIMPLE: RIGHT CHEEK
LOCATION SIMPLE: RIGHT UPPER ARM
LOCATION SIMPLE: LEFT EAR
LOCATION SIMPLE: LEFT FOREARM
LOCATION SIMPLE: RIGHT ANTERIOR NECK
LOCATION SIMPLE: LEFT ANTERIOR NECK
LOCATION SIMPLE: UPPER BACK

## 2022-03-07 ASSESSMENT — LOCATION ZONE DERM
LOCATION ZONE: TRUNK
LOCATION ZONE: ARM
LOCATION ZONE: EAR
LOCATION ZONE: FACE
LOCATION ZONE: NECK

## 2022-03-07 ASSESSMENT — LOCATION DETAILED DESCRIPTION DERM
LOCATION DETAILED: LEFT MEDIAL FOREHEAD
LOCATION DETAILED: LEFT INFERIOR CRUS OF ANTIHELIX
LOCATION DETAILED: RIGHT CRUS OF HELIX
LOCATION DETAILED: RIGHT ANTERIOR SHOULDER
LOCATION DETAILED: RIGHT ANTERIOR PROXIMAL UPPER ARM
LOCATION DETAILED: XIPHOID
LOCATION DETAILED: RIGHT INFERIOR CENTRAL MALAR CHEEK
LOCATION DETAILED: RIGHT SUPERIOR ANTERIOR NECK
LOCATION DETAILED: LEFT PROXIMAL DORSAL FOREARM
LOCATION DETAILED: PERIUMBILICAL SKIN
LOCATION DETAILED: SUPERIOR THORACIC SPINE
LOCATION DETAILED: LEFT ANTERIOR PROXIMAL UPPER ARM
LOCATION DETAILED: INFERIOR THORACIC SPINE
LOCATION DETAILED: RIGHT INFERIOR ANTERIOR NECK
LOCATION DETAILED: LEFT INFERIOR LATERAL NECK

## 2022-03-07 ASSESSMENT — SEVERITY ASSESSMENT: SEVERITY: MILD TO MODERATE

## 2022-03-07 NOTE — PROCEDURE: INTRALESIONAL KENALOG
Include Z78.9 (Other Specified Conditions Influencing Health Status) As An Associated Diagnosis?: No
Validate Note Data When Using Inventory: Yes
Administered By (Optional): Dr. Ashish Acosta
Medical Necessity Clause: This procedure was medically necessary because the lesions that were treated were:
Kenalog Preparation: Kenalog
Total Volume (Ccs): .2
Detail Level: Detailed
X Size Of Lesion In Cm (Optional): 0
Concentration Of Kenalog Solution Injected (Mg/Ml): 5.0
Consent: The risks of atrophy were reviewed with the patient.

## 2022-03-07 NOTE — PROCEDURE: LIQUID NITROGEN
Spray Paint Text: The liquid nitrogen was applied to the skin utilizing a spray paint frosting technique.
Add 52 Modifier (Optional): no
Show Aperture Variable?: Yes
Consent: The patient's consent was obtained including but not limited to risks of crusting, scabbing, blistering, scarring, darker or lighter pigmentary change, recurrence, incomplete removal and infection.
Post-Care Instructions: I reviewed with the patient in detail post-care instructions. Patient is to wear sunprotection, and avoid picking at any of the treated lesions. Pt may apply Vaseline to crusted or scabbing areas.
Detail Level: Detailed
Medical Necessity Clause: This procedure was medically necessary because the lesions that were treated were:
Medical Necessity Information: It is in your best interest to select a reason for this procedure from the list below. All of these items fulfill various CMS LCD requirements except the new and changing color options.

## 2022-03-07 NOTE — PROCEDURE: TREATMENT REGIMEN
Detail Level: Zone
Initiate Treatment: Fluorouracil 5% cream to face and ears twice a day x 4 weeks
Plan: Proper shaving techniques discussed

## 2022-11-14 ENCOUNTER — APPOINTMENT (RX ONLY)
Age: 57
Setting detail: DERMATOLOGY
End: 2022-11-14

## 2022-11-14 DIAGNOSIS — B36.0 PITYRIASIS VERSICOLOR: ICD-10-CM | Status: INADEQUATELY CONTROLLED

## 2022-11-14 DIAGNOSIS — Z85.828 PERSONAL HISTORY OF OTHER MALIGNANT NEOPLASM OF SKIN: ICD-10-CM | Status: RESOLVED

## 2022-11-14 DIAGNOSIS — Z87.2 PERSONAL HISTORY OF DISEASES OF THE SKIN AND SUBCUTANEOUS TISSUE: ICD-10-CM | Status: WELL CONTROLLED

## 2022-11-14 PROCEDURE — ? PRESCRIPTION

## 2022-11-14 PROCEDURE — 99214 OFFICE O/P EST MOD 30 MIN: CPT

## 2022-11-14 PROCEDURE — ? COUNSELING

## 2022-11-14 RX ORDER — KETOCONAZOLE 20 MG/ML
SHAMPOO, SUSPENSION TOPICAL
Qty: 120 | Refills: 0 | Status: ERX | COMMUNITY
Start: 2022-11-14

## 2022-11-14 RX ORDER — FLUCONAZOLE 150 MG/1
TABLET ORAL
Qty: 4 | Refills: 0 | Status: ERX | COMMUNITY
Start: 2022-11-14

## 2022-11-14 RX ADMIN — FLUCONAZOLE: 150 TABLET ORAL at 00:00

## 2022-11-14 RX ADMIN — KETOCONAZOLE: 20 SHAMPOO, SUSPENSION TOPICAL at 00:00

## 2022-11-14 ASSESSMENT — LOCATION DETAILED DESCRIPTION DERM
LOCATION DETAILED: RIGHT ANTERIOR PROXIMAL UPPER ARM
LOCATION DETAILED: RIGHT ANTERIOR SHOULDER
LOCATION DETAILED: LEFT ANTERIOR PROXIMAL UPPER ARM
LOCATION DETAILED: XIPHOID
LOCATION DETAILED: RIGHT SUPERIOR ANTERIOR NECK
LOCATION DETAILED: RIGHT ANTIHELIX
LOCATION DETAILED: RIGHT INFERIOR CENTRAL MALAR CHEEK
LOCATION DETAILED: LEFT INFERIOR CRUS OF ANTIHELIX
LOCATION DETAILED: LEFT CENTRAL MALAR CHEEK
LOCATION DETAILED: LEFT PROXIMAL DORSAL FOREARM
LOCATION DETAILED: SUPERIOR THORACIC SPINE

## 2022-11-14 ASSESSMENT — LOCATION SIMPLE DESCRIPTION DERM
LOCATION SIMPLE: RIGHT EAR
LOCATION SIMPLE: UPPER BACK
LOCATION SIMPLE: RIGHT ANTERIOR NECK
LOCATION SIMPLE: ABDOMEN
LOCATION SIMPLE: LEFT UPPER ARM
LOCATION SIMPLE: LEFT FOREARM
LOCATION SIMPLE: LEFT EAR
LOCATION SIMPLE: LEFT CHEEK
LOCATION SIMPLE: RIGHT SHOULDER
LOCATION SIMPLE: RIGHT UPPER ARM
LOCATION SIMPLE: RIGHT CHEEK

## 2022-11-14 ASSESSMENT — LOCATION ZONE DERM
LOCATION ZONE: NECK
LOCATION ZONE: TRUNK
LOCATION ZONE: EAR
LOCATION ZONE: ARM
LOCATION ZONE: FACE

## 2022-11-14 ASSESSMENT — SEVERITY ASSESSMENT: SEVERITY: MILD

## 2023-05-19 ENCOUNTER — APPOINTMENT (RX ONLY)
Age: 58
Setting detail: DERMATOLOGY
End: 2023-05-19

## 2023-05-19 DIAGNOSIS — L81.4 OTHER MELANIN HYPERPIGMENTATION: ICD-10-CM

## 2023-05-19 DIAGNOSIS — L28.1 PRURIGO NODULARIS: ICD-10-CM

## 2023-05-19 DIAGNOSIS — L82.1 OTHER SEBORRHEIC KERATOSIS: ICD-10-CM

## 2023-05-19 DIAGNOSIS — D22 MELANOCYTIC NEVI: ICD-10-CM

## 2023-05-19 PROBLEM — L30.9 DERMATITIS, UNSPECIFIED: Status: ACTIVE | Noted: 2023-05-19

## 2023-05-19 PROBLEM — I10 ESSENTIAL (PRIMARY) HYPERTENSION: Status: ACTIVE | Noted: 2023-05-19

## 2023-05-19 PROBLEM — D22.5 MELANOCYTIC NEVI OF TRUNK: Status: ACTIVE | Noted: 2023-05-19

## 2023-05-19 PROCEDURE — 99213 OFFICE O/P EST LOW 20 MIN: CPT | Mod: 25

## 2023-05-19 PROCEDURE — ? COUNSELING

## 2023-05-19 PROCEDURE — ? BIOPSY BY SHAVE METHOD

## 2023-05-19 PROCEDURE — ? SUNSCREEN RECOMMENDATIONS

## 2023-05-19 PROCEDURE — 11102 TANGNTL BX SKIN SINGLE LES: CPT

## 2023-05-19 ASSESSMENT — LOCATION DETAILED DESCRIPTION DERM
LOCATION DETAILED: RIGHT SUPERIOR PARIETAL SCALP
LOCATION DETAILED: RIGHT SUPERIOR UPPER BACK
LOCATION DETAILED: LEFT SUPERIOR MEDIAL MIDBACK
LOCATION DETAILED: INFERIOR THORACIC SPINE

## 2023-05-19 ASSESSMENT — LOCATION SIMPLE DESCRIPTION DERM
LOCATION SIMPLE: SCALP
LOCATION SIMPLE: RIGHT UPPER BACK
LOCATION SIMPLE: LEFT LOWER BACK
LOCATION SIMPLE: UPPER BACK

## 2023-05-19 ASSESSMENT — LOCATION ZONE DERM
LOCATION ZONE: TRUNK
LOCATION ZONE: SCALP

## 2023-05-19 NOTE — HPI: UPPER BODY SKIN CHECK
How Severe Are Your Spot(S)?: mild
What Is The Reason For Today's Visit?: Upper Body Skin Exam
Additional History: No concerns today

## 2023-05-19 NOTE — PROCEDURE: BIOPSY BY SHAVE METHOD
Hemostasis: Electrocautery
Destruction After The Procedure: No
X Size Of Lesion In Cm: 0
Billing Type: Third-Party Bill
Was A Bandage Applied: Yes
Curettage Text: The wound bed was treated with curettage after the biopsy was performed.
Anesthesia Type: 2% lidocaine without epinephrine
Consent: Verbal consent was obtained and risks were reviewed including but not limited to scarring, infection, bleeding, scabbing, incomplete removal, nerve damage and allergy to anesthesia.
Biopsy Method: Dermablade
Dressing: bandage
Anesthesia Volume In Cc: 0.5
Post-Care Instructions: I reviewed with the patient in detail post-care instructions. Patient is to keep the biopsy site dry overnight, and then apply bacitracin twice daily until healed. Patient may apply hydrogen peroxide soaks to remove any crusting.
Silver Nitrate Text: The wound bed was treated with silver nitrate after the biopsy was performed.
Depth Of Biopsy: dermis
Electrodesiccation And Curettage Text: The wound bed was treated with electrodesiccation and curettage after the biopsy was performed.
Biopsy Type: H and E
Size Of Lesion In Cm: 0.3
Wound Care: Aquaphor
Type Of Destruction Used: Electrodesiccation and Curettage
Information: Selecting Yes will display possible errors in your note based on the variables you have selected. This validation is only offered as a suggestion for you. PLEASE NOTE THAT THE VALIDATION TEXT WILL BE REMOVED WHEN YOU FINALIZE YOUR NOTE. IF YOU WANT TO FAX A PRELIMINARY NOTE YOU WILL NEED TO TOGGLE THIS TO 'NO' IF YOU DO NOT WANT IT IN YOUR FAXED NOTE.
Electrodesiccation Text: The wound bed was treated with electrodesiccation after the biopsy was performed.
Notification Instructions: Patient will be notified of biopsy results. However, patient instructed to call the office if not contacted within 2 weeks.
Cryotherapy Text: The wound bed was treated with cryotherapy after the biopsy was performed.
Detail Level: Detailed

## 2023-06-20 ENCOUNTER — APPOINTMENT (RX ONLY)
Age: 58
Setting detail: DERMATOLOGY
End: 2023-06-20

## 2023-06-20 DIAGNOSIS — D22 MELANOCYTIC NEVI: ICD-10-CM | Status: STABLE

## 2023-06-20 DIAGNOSIS — L57.8 OTHER SKIN CHANGES DUE TO CHRONIC EXPOSURE TO NONIONIZING RADIATION: ICD-10-CM

## 2023-06-20 DIAGNOSIS — Z87.2 PERSONAL HISTORY OF DISEASES OF THE SKIN AND SUBCUTANEOUS TISSUE: ICD-10-CM | Status: RESOLVED

## 2023-06-20 DIAGNOSIS — Z71.89 OTHER SPECIFIED COUNSELING: ICD-10-CM

## 2023-06-20 DIAGNOSIS — L82.1 OTHER SEBORRHEIC KERATOSIS: ICD-10-CM | Status: STABLE

## 2023-06-20 PROBLEM — D22.4 MELANOCYTIC NEVI OF SCALP AND NECK: Status: ACTIVE | Noted: 2023-06-20

## 2023-06-20 PROCEDURE — 99213 OFFICE O/P EST LOW 20 MIN: CPT

## 2023-06-20 PROCEDURE — ? SUNSCREEN RECOMMENDATIONS

## 2023-06-20 PROCEDURE — ? COUNSELING

## 2023-06-20 ASSESSMENT — LOCATION SIMPLE DESCRIPTION DERM
LOCATION SIMPLE: RIGHT CHEEK
LOCATION SIMPLE: LEFT CHEEK
LOCATION SIMPLE: RIGHT SCALP
LOCATION SIMPLE: SCALP
LOCATION SIMPLE: POSTERIOR SCALP
LOCATION SIMPLE: RIGHT CHEEK

## 2023-06-20 ASSESSMENT — LOCATION DETAILED DESCRIPTION DERM
LOCATION DETAILED: RIGHT MEDIAL FRONTAL SCALP
LOCATION DETAILED: LEFT INFERIOR CENTRAL MALAR CHEEK
LOCATION DETAILED: LEFT SUPERIOR LATERAL BUCCAL CHEEK
LOCATION DETAILED: RIGHT INFERIOR CENTRAL MALAR CHEEK
LOCATION DETAILED: RIGHT CENTRAL MALAR CHEEK
LOCATION DETAILED: POSTERIOR MID-PARIETAL SCALP
LOCATION DETAILED: RIGHT CENTRAL PARIETAL SCALP

## 2023-06-20 ASSESSMENT — LOCATION ZONE DERM
LOCATION ZONE: SCALP
LOCATION ZONE: FACE
LOCATION ZONE: FACE

## 2024-08-20 ENCOUNTER — APPOINTMENT (RX ONLY)
Age: 59
Setting detail: DERMATOLOGY
End: 2024-08-20

## 2024-08-20 DIAGNOSIS — L56.8 OTHER SPECIFIED ACUTE SKIN CHANGES DUE TO ULTRAVIOLET RADIATION: ICD-10-CM | Status: INADEQUATELY CONTROLLED

## 2024-08-20 DIAGNOSIS — L57.0 ACTINIC KERATOSIS: ICD-10-CM | Status: INADEQUATELY CONTROLLED

## 2024-08-20 DIAGNOSIS — D22 MELANOCYTIC NEVI: ICD-10-CM | Status: STABLE

## 2024-08-20 DIAGNOSIS — L82.1 OTHER SEBORRHEIC KERATOSIS: ICD-10-CM | Status: STABLE

## 2024-08-20 DIAGNOSIS — Z85.828 PERSONAL HISTORY OF OTHER MALIGNANT NEOPLASM OF SKIN: ICD-10-CM | Status: RESOLVED

## 2024-08-20 PROBLEM — D22.39 MELANOCYTIC NEVI OF OTHER PARTS OF FACE: Status: ACTIVE | Noted: 2024-08-20

## 2024-08-20 PROCEDURE — 99214 OFFICE O/P EST MOD 30 MIN: CPT | Mod: 25

## 2024-08-20 PROCEDURE — 17000 DESTRUCT PREMALG LESION: CPT

## 2024-08-20 PROCEDURE — ? COUNSELING

## 2024-08-20 PROCEDURE — ? CURETTAGE AND DESTRUCTION

## 2024-08-20 PROCEDURE — ? PRESCRIPTION

## 2024-08-20 PROCEDURE — ? TREATMENT REGIMEN

## 2024-08-20 RX ORDER — FLUOROURACIL 5 MG/G
CREAM TOPICAL
Qty: 40 | Refills: 1 | Status: ERX | COMMUNITY
Start: 2024-08-20

## 2024-08-20 RX ADMIN — FLUOROURACIL: 5 CREAM TOPICAL at 00:00

## 2024-08-20 ASSESSMENT — LOCATION DETAILED DESCRIPTION DERM
LOCATION DETAILED: LEFT INFERIOR LATERAL MALAR CHEEK
LOCATION DETAILED: LEFT CENTRAL MALAR CHEEK
LOCATION DETAILED: RIGHT SUPERIOR ANTERIOR NECK
LOCATION DETAILED: LEFT PROXIMAL DORSAL FOREARM
LOCATION DETAILED: LEFT ULNAR DORSAL HAND
LOCATION DETAILED: RIGHT LATERAL MALAR CHEEK
LOCATION DETAILED: RIGHT ANTERIOR SHOULDER
LOCATION DETAILED: RIGHT DISTAL DORSAL FOREARM
LOCATION DETAILED: LEFT PROXIMAL PRETIBIAL REGION
LOCATION DETAILED: RIGHT INFERIOR CENTRAL MALAR CHEEK
LOCATION DETAILED: RIGHT ANTIHELIX
LOCATION DETAILED: RIGHT RADIAL DORSAL HAND
LOCATION DETAILED: LEFT ANTIHELIX
LOCATION DETAILED: LEFT DISTAL DORSAL FOREARM
LOCATION DETAILED: RIGHT INFERIOR VERMILION LIP
LOCATION DETAILED: LEFT INFERIOR VERMILION LIP

## 2024-08-20 ASSESSMENT — LOCATION ZONE DERM
LOCATION ZONE: NECK
LOCATION ZONE: EAR
LOCATION ZONE: FACE
LOCATION ZONE: HAND
LOCATION ZONE: LEG
LOCATION ZONE: ARM
LOCATION ZONE: LIP

## 2024-08-20 ASSESSMENT — LOCATION SIMPLE DESCRIPTION DERM
LOCATION SIMPLE: LEFT FOREARM
LOCATION SIMPLE: RIGHT CHEEK
LOCATION SIMPLE: RIGHT EAR
LOCATION SIMPLE: RIGHT FOREARM
LOCATION SIMPLE: LEFT EAR
LOCATION SIMPLE: RIGHT LIP
LOCATION SIMPLE: RIGHT SHOULDER
LOCATION SIMPLE: RIGHT HAND
LOCATION SIMPLE: LEFT PRETIBIAL REGION
LOCATION SIMPLE: LEFT HAND
LOCATION SIMPLE: LEFT CHEEK
LOCATION SIMPLE: LEFT LIP
LOCATION SIMPLE: RIGHT ANTERIOR NECK

## 2024-08-20 NOTE — PROCEDURE: REASSURANCE
Hide Include Location In Plan Question?: No
Detail Level: Zone
Nicotinamide Supplementation Recommendations: Contact physician writing statin drug for their [place] since there is a potential for a drug interaction between niacinamide and a statin drug.
(4) no limitation

## 2024-08-20 NOTE — PROCEDURE: CURETTAGE AND DESTRUCTION
Additional Information: (Optional): The wound was cleaned, and a pressure dressing was applied.  The patient received detailed post-op instructions.
Size Of Lesion In Cm: 0.5
Consent was obtained from the patient. The risks, benefits and alternatives to therapy were discussed in detail. Specifically, the risks of infection, scarring, bleeding, prolonged wound healing, nerve injury, incomplete removal, allergy to anesthesia and recurrence were addressed. Alternatives to ED&C, such as: surgical removal and XRT were also discussed.  Prior to the procedure, the treatment site was clearly identified and confirmed by the patient. All components of Universal Protocol/PAUSE Rule completed.
Render Post-Care Instructions In Note?: no
Cautery Type: electrodesiccation
What Was Performed First?: Curettage
Detail Level: Detailed
Size Of Lesion After Curettage: 0.6
Anesthesia Type: 1% lidocaine with epinephrine
Post-Care Instructions: I reviewed with the patient in detail post-care instructions. Patient is to keep the area dry for 48 hours, and not to engage in any swimming until the area is healed. Should the patient develop any fevers, chills, bleeding, severe pain patient will contact the office immediately.
Anesthesia Volume In Cc: 0
Number Of Curettages: 2

## 2024-12-02 ENCOUNTER — APPOINTMENT (RX ONLY)
Dept: URBAN - METROPOLITAN AREA CLINIC 109 | Facility: CLINIC | Age: 59
Setting detail: DERMATOLOGY
End: 2024-12-02

## 2024-12-02 DIAGNOSIS — Z87.2 PERSONAL HISTORY OF DISEASES OF THE SKIN AND SUBCUTANEOUS TISSUE: ICD-10-CM | Status: RESOLVED

## 2024-12-02 DIAGNOSIS — Z85.828 PERSONAL HISTORY OF OTHER MALIGNANT NEOPLASM OF SKIN: ICD-10-CM | Status: RESOLVED

## 2024-12-02 DIAGNOSIS — D22 MELANOCYTIC NEVI: ICD-10-CM | Status: STABLE

## 2024-12-02 DIAGNOSIS — L82.1 OTHER SEBORRHEIC KERATOSIS: ICD-10-CM | Status: STABLE

## 2024-12-02 PROBLEM — D22.39 MELANOCYTIC NEVI OF OTHER PARTS OF FACE: Status: ACTIVE | Noted: 2024-12-02

## 2024-12-02 PROCEDURE — ? COUNSELING

## 2024-12-02 PROCEDURE — 99213 OFFICE O/P EST LOW 20 MIN: CPT

## 2024-12-02 ASSESSMENT — LOCATION DETAILED DESCRIPTION DERM
LOCATION DETAILED: LEFT CYMBA CONCHA
LOCATION DETAILED: RIGHT ULNAR DORSAL HAND
LOCATION DETAILED: LEFT MEDIAL FOREHEAD
LOCATION DETAILED: LEFT INFERIOR MEDIAL FOREHEAD
LOCATION DETAILED: SUPERIOR MID FOREHEAD
LOCATION DETAILED: LEFT RADIAL DORSAL HAND
LOCATION DETAILED: RIGHT INFERIOR CRUS OF ANTIHELIX
LOCATION DETAILED: LEFT DISTAL DORSAL FOREARM
LOCATION DETAILED: RIGHT DISTAL DORSAL FOREARM

## 2024-12-02 ASSESSMENT — LOCATION SIMPLE DESCRIPTION DERM
LOCATION SIMPLE: RIGHT EAR
LOCATION SIMPLE: LEFT FOREARM
LOCATION SIMPLE: SUPERIOR FOREHEAD
LOCATION SIMPLE: RIGHT FOREARM
LOCATION SIMPLE: LEFT FOREHEAD
LOCATION SIMPLE: LEFT HAND
LOCATION SIMPLE: RIGHT HAND
LOCATION SIMPLE: LEFT EAR

## 2024-12-02 ASSESSMENT — LOCATION ZONE DERM
LOCATION ZONE: FACE
LOCATION ZONE: ARM
LOCATION ZONE: EAR
LOCATION ZONE: HAND

## 2025-05-22 ENCOUNTER — APPOINTMENT (OUTPATIENT)
Dept: URBAN - METROPOLITAN AREA CLINIC 109 | Facility: CLINIC | Age: 60
Setting detail: DERMATOLOGY
End: 2025-05-22

## 2025-05-22 DIAGNOSIS — Z87.2 PERSONAL HISTORY OF DISEASES OF THE SKIN AND SUBCUTANEOUS TISSUE: ICD-10-CM | Status: STABLE

## 2025-05-22 DIAGNOSIS — L82.1 OTHER SEBORRHEIC KERATOSIS: ICD-10-CM | Status: STABLE

## 2025-05-22 DIAGNOSIS — L73.9 FOLLICULAR DISORDER, UNSPECIFIED: ICD-10-CM | Status: INADEQUATELY CONTROLLED

## 2025-05-22 DIAGNOSIS — Z85.828 PERSONAL HISTORY OF OTHER MALIGNANT NEOPLASM OF SKIN: ICD-10-CM | Status: RESOLVED

## 2025-05-22 DIAGNOSIS — D22 MELANOCYTIC NEVI: ICD-10-CM | Status: STABLE

## 2025-05-22 PROBLEM — D22.62 MELANOCYTIC NEVI OF LEFT UPPER LIMB, INCLUDING SHOULDER: Status: ACTIVE | Noted: 2025-05-22

## 2025-05-22 PROBLEM — D22.72 MELANOCYTIC NEVI OF LEFT LOWER LIMB, INCLUDING HIP: Status: ACTIVE | Noted: 2025-05-22

## 2025-05-22 PROBLEM — D22.39 MELANOCYTIC NEVI OF OTHER PARTS OF FACE: Status: ACTIVE | Noted: 2025-05-22

## 2025-05-22 PROBLEM — D22.71 MELANOCYTIC NEVI OF RIGHT LOWER LIMB, INCLUDING HIP: Status: ACTIVE | Noted: 2025-05-22

## 2025-05-22 PROBLEM — D22.61 MELANOCYTIC NEVI OF RIGHT UPPER LIMB, INCLUDING SHOULDER: Status: ACTIVE | Noted: 2025-05-22

## 2025-05-22 PROCEDURE — 99213 OFFICE O/P EST LOW 20 MIN: CPT

## 2025-05-22 PROCEDURE — ? COUNSELING

## 2025-05-22 PROCEDURE — ? TREATMENT REGIMEN

## 2025-05-22 ASSESSMENT — LOCATION DETAILED DESCRIPTION DERM
LOCATION DETAILED: LEFT DISTAL DORSAL FOREARM
LOCATION DETAILED: RIGHT DISTAL DORSAL FOREARM
LOCATION DETAILED: RIGHT PROXIMAL RADIAL DORSAL FOREARM
LOCATION DETAILED: RIGHT RADIAL DORSAL HAND
LOCATION DETAILED: LEFT PROXIMAL DORSAL FOREARM
LOCATION DETAILED: RIGHT DISTAL PRETIBIAL REGION
LOCATION DETAILED: LEFT PROXIMAL PRETIBIAL REGION
LOCATION DETAILED: LEFT SUPERIOR ANTERIOR NECK
LOCATION DETAILED: RIGHT PROXIMAL PRETIBIAL REGION
LOCATION DETAILED: INFERIOR MID FOREHEAD
LOCATION DETAILED: LEFT SUPERIOR LATERAL BUCCAL CHEEK
LOCATION DETAILED: LEFT DORSAL WRIST
LOCATION DETAILED: RIGHT CENTRAL FRONTAL SCALP
LOCATION DETAILED: LEFT SUPERIOR LATERAL MALAR CHEEK
LOCATION DETAILED: LEFT DISTAL PRETIBIAL REGION
LOCATION DETAILED: RIGHT ANTERIOR SHOULDER
LOCATION DETAILED: LEFT CENTRAL MALAR CHEEK
LOCATION DETAILED: LEFT ULNAR DORSAL HAND

## 2025-05-22 ASSESSMENT — LOCATION ZONE DERM
LOCATION ZONE: SCALP
LOCATION ZONE: FACE
LOCATION ZONE: HAND
LOCATION ZONE: LEG
LOCATION ZONE: NECK
LOCATION ZONE: ARM

## 2025-05-22 ASSESSMENT — LOCATION SIMPLE DESCRIPTION DERM
LOCATION SIMPLE: INFERIOR FOREHEAD
LOCATION SIMPLE: SCALP
LOCATION SIMPLE: LEFT PRETIBIAL REGION
LOCATION SIMPLE: RIGHT FOREARM
LOCATION SIMPLE: LEFT CHEEK
LOCATION SIMPLE: RIGHT HAND
LOCATION SIMPLE: RIGHT PRETIBIAL REGION
LOCATION SIMPLE: LEFT FOREARM
LOCATION SIMPLE: LEFT HAND
LOCATION SIMPLE: LEFT ANTERIOR NECK
LOCATION SIMPLE: RIGHT SHOULDER
LOCATION SIMPLE: LEFT WRIST

## 2025-05-22 ASSESSMENT — SEVERITY ASSESSMENT: SEVERITY: MILD

## 2025-05-22 ASSESSMENT — BSA RASH: BSA RASH: 1
